# Patient Record
Sex: FEMALE | Race: WHITE | NOT HISPANIC OR LATINO
[De-identification: names, ages, dates, MRNs, and addresses within clinical notes are randomized per-mention and may not be internally consistent; named-entity substitution may affect disease eponyms.]

---

## 2019-01-18 PROBLEM — Z00.00 ENCOUNTER FOR PREVENTIVE HEALTH EXAMINATION: Status: ACTIVE | Noted: 2019-01-18

## 2019-01-22 ENCOUNTER — APPOINTMENT (OUTPATIENT)
Dept: SURGERY | Facility: CLINIC | Age: 34
End: 2019-01-22
Payer: COMMERCIAL

## 2019-01-22 VITALS
HEART RATE: 79 BPM | RESPIRATION RATE: 16 BRPM | OXYGEN SATURATION: 96 % | SYSTOLIC BLOOD PRESSURE: 123 MMHG | BODY MASS INDEX: 40.4 KG/M2 | DIASTOLIC BLOOD PRESSURE: 85 MMHG | WEIGHT: 214 LBS | HEIGHT: 61 IN

## 2019-01-22 DIAGNOSIS — Z80.42 FAMILY HISTORY OF MALIGNANT NEOPLASM OF PROSTATE: ICD-10-CM

## 2019-01-22 DIAGNOSIS — Z78.9 OTHER SPECIFIED HEALTH STATUS: ICD-10-CM

## 2019-01-22 PROCEDURE — 99242 OFF/OP CONSLTJ NEW/EST SF 20: CPT

## 2019-01-22 NOTE — PHYSICAL EXAM
[Normal Thyroid] : the thyroid was normal [Normal Breath Sounds] : Normal breath sounds [Normal Heart Sounds] : normal heart sounds [Normal Rate and Rhythm] : normal rate and rhythm [No HSM] : no hepatosplenomegaly [No Rash or Lesion] : No rash or lesion [Alert] : alert [Oriented to Person] : oriented to person [Oriented to Place] : oriented to place [Oriented to Time] : oriented to time [Calm] : calm [JVD] : no jugular venous distention  [Abdominal Masses] : No abdominal masses [Abdomen Tenderness] : ~T ~M No abdominal tenderness [Tender] : was nontender [Enlarged] : not enlarged [de-identified] : alert and oriented x 3 [de-identified] : normal [de-identified] : normal [de-identified] : anterior nodular abdominal wall mass [de-identified] : s

## 2019-01-22 NOTE — HISTORY OF PRESENT ILLNESS
[de-identified] : 32 yo female with no significant past medical history who presents to the office for evaluation of abdominal wall nodule. She states that she found it recently and has been bothering her since then.  She states that the area is tender to palpation but states that it has not gotten worse.

## 2019-01-22 NOTE — CONSULT LETTER
[Dear  ___] : Dear  [unfilled], [Consult Letter:] : I had the pleasure of evaluating your patient, [unfilled]. [Please see my note below.] : Please see my note below. [Consult Closing:] : Thank you very much for allowing me to participate in the care of this patient.  If you have any questions, please do not hesitate to contact me. [FreeTextEntry3] : Sincerely, \par \par \par David Buckley MD, FACS\par  of Surgery\par [facility]\par \par [employer] [facility]\par Associate \par General Surgery Residency\par [employer] [facility]\par

## 2019-01-22 NOTE — ASSESSMENT
[FreeTextEntry1] : 34 yo female with anterior abdominal wall mass\par -Excision under local anesthesia in the office\par -All risk, benefit, alternatives explained and the patient expressed full understanding.

## 2019-01-24 ENCOUNTER — LABORATORY RESULT (OUTPATIENT)
Age: 34
End: 2019-01-24

## 2019-01-24 ENCOUNTER — APPOINTMENT (OUTPATIENT)
Dept: SURGERY | Facility: CLINIC | Age: 34
End: 2019-01-24
Payer: COMMERCIAL

## 2019-01-24 VITALS
OXYGEN SATURATION: 99 % | SYSTOLIC BLOOD PRESSURE: 129 MMHG | WEIGHT: 199.56 LBS | DIASTOLIC BLOOD PRESSURE: 80 MMHG | BODY MASS INDEX: 37.68 KG/M2 | HEART RATE: 78 BPM | HEIGHT: 61 IN | TEMPERATURE: 98.4 F

## 2019-01-24 VITALS — DIASTOLIC BLOOD PRESSURE: 92 MMHG | OXYGEN SATURATION: 100 % | HEART RATE: 81 BPM | SYSTOLIC BLOOD PRESSURE: 135 MMHG

## 2019-01-24 PROCEDURE — 22902 EXC ABD LES SC < 3 CM: CPT

## 2019-01-24 NOTE — PROCEDURE
[FreeTextEntry1] : Procedure: Exicision of abdominal wall mass\par Path: Abdominal wall mass\par \par After informed consent was obtained, the abdominal wall was prepped and draped in sterile manner. Using 1% lidocaine, the abdominal wall was anesthesized. Using elliptical incision, the area was incised. At the conclusion of the excision, the area was probed to make sure there was no missing mass and I was sure that all nodular mass was removed in entirely.  Hemostasis was obtained and I begun the closure. Using 2-0 vicryl sutures, the skin was approximated. After that, 3-0 vicryl was used to approximate the wound further. 4-0 monocryl was used to approximate the skin further. Steristrips were placed over the wound. The patient tolerated the procedure well.

## 2019-02-04 ENCOUNTER — APPOINTMENT (OUTPATIENT)
Dept: SURGERY | Facility: CLINIC | Age: 34
End: 2019-02-04
Payer: COMMERCIAL

## 2019-02-04 VITALS
DIASTOLIC BLOOD PRESSURE: 87 MMHG | SYSTOLIC BLOOD PRESSURE: 129 MMHG | BODY MASS INDEX: 37.57 KG/M2 | HEART RATE: 76 BPM | WEIGHT: 199 LBS | HEIGHT: 61 IN | OXYGEN SATURATION: 97 %

## 2019-02-04 DIAGNOSIS — R22.2 LOCALIZED SWELLING, MASS AND LUMP, TRUNK: ICD-10-CM

## 2019-02-04 PROCEDURE — 99024 POSTOP FOLLOW-UP VISIT: CPT

## 2019-02-04 NOTE — HISTORY OF PRESENT ILLNESS
[de-identified] : Pt seen and examined. Pt is s/p excision of anterior abdominal wall nodule. Pathology revealed Lipoma. The wound edges are healing well but will need to be checked again before she can start wearing heavy holster for her job as . I don’t want the wound to break down.  Pt was advised to follow up next week with the office for follow up visit.

## 2019-02-11 ENCOUNTER — APPOINTMENT (OUTPATIENT)
Dept: SURGERY | Facility: CLINIC | Age: 34
End: 2019-02-11
Payer: COMMERCIAL

## 2019-02-11 VITALS
SYSTOLIC BLOOD PRESSURE: 131 MMHG | HEART RATE: 85 BPM | HEIGHT: 61 IN | OXYGEN SATURATION: 98 % | BODY MASS INDEX: 37.57 KG/M2 | WEIGHT: 199 LBS | DIASTOLIC BLOOD PRESSURE: 92 MMHG

## 2019-02-11 PROCEDURE — 99024 POSTOP FOLLOW-UP VISIT: CPT

## 2019-02-11 NOTE — HISTORY OF PRESENT ILLNESS
[de-identified] : pt seen and examined. pt is s/p excision of abdominal wall mass. wound edges healing well. one area had 3mm necrotic edges which was removed. pt was advised to keep the area clean and dry and  come back as needed.

## 2019-08-12 ENCOUNTER — APPOINTMENT (OUTPATIENT)
Dept: SURGERY | Facility: CLINIC | Age: 34
End: 2019-08-12

## 2020-05-12 ENCOUNTER — TRANSCRIPTION ENCOUNTER (OUTPATIENT)
Age: 35
End: 2020-05-12

## 2020-10-07 ENCOUNTER — TRANSCRIPTION ENCOUNTER (OUTPATIENT)
Age: 35
End: 2020-10-07

## 2020-12-10 ENCOUNTER — APPOINTMENT (OUTPATIENT)
Dept: OBGYN | Facility: CLINIC | Age: 35
End: 2020-12-10

## 2021-12-28 ENCOUNTER — TRANSCRIPTION ENCOUNTER (OUTPATIENT)
Age: 36
End: 2021-12-28

## 2022-06-23 ENCOUNTER — NON-APPOINTMENT (OUTPATIENT)
Age: 37
End: 2022-06-23

## 2022-12-15 ENCOUNTER — NON-APPOINTMENT (OUTPATIENT)
Age: 37
End: 2022-12-15

## 2023-03-01 ENCOUNTER — LABORATORY RESULT (OUTPATIENT)
Age: 38
End: 2023-03-01

## 2023-03-01 ENCOUNTER — NON-APPOINTMENT (OUTPATIENT)
Age: 38
End: 2023-03-01

## 2023-03-01 ENCOUNTER — TRANSCRIPTION ENCOUNTER (OUTPATIENT)
Age: 38
End: 2023-03-01

## 2023-03-01 ENCOUNTER — APPOINTMENT (OUTPATIENT)
Dept: CARDIOLOGY | Facility: CLINIC | Age: 38
End: 2023-03-01
Payer: COMMERCIAL

## 2023-03-01 VITALS
SYSTOLIC BLOOD PRESSURE: 156 MMHG | OXYGEN SATURATION: 99 % | DIASTOLIC BLOOD PRESSURE: 94 MMHG | HEIGHT: 61 IN | WEIGHT: 254 LBS | BODY MASS INDEX: 47.95 KG/M2 | HEART RATE: 71 BPM | RESPIRATION RATE: 16 BRPM | TEMPERATURE: 98.4 F

## 2023-03-01 DIAGNOSIS — R01.1 CARDIAC MURMUR, UNSPECIFIED: ICD-10-CM

## 2023-03-01 DIAGNOSIS — I10 ESSENTIAL (PRIMARY) HYPERTENSION: ICD-10-CM

## 2023-03-01 PROCEDURE — 99203 OFFICE O/P NEW LOW 30 MIN: CPT | Mod: 25

## 2023-03-01 PROCEDURE — 93000 ELECTROCARDIOGRAM COMPLETE: CPT

## 2023-03-01 NOTE — REASON FOR VISIT
[CV Risk Factors and Non-Cardiac Disease] : CV risk factors and non-cardiac disease [Hypertension] : hypertension [FreeTextEntry3] : Dr. Main Peters [FreeTextEntry1] : Patient is 37 year old female working in a  presenting for cardiac evaluation for high blood pressure. She denies past medical history but is status post hysterectomy, COVID March 2020 and Dec 2022. On 2/27/23, she states she was given a Kenalog injection in her left knee for a meniscal tear. After that, her BP jumped to 177/100. She denied any symptoms at the time other than the high blood pressure. Since then her BP has been around 140/90. Today she states it was 130/88. In office, her BP was 140/94. She is overall doing well now and denies chest pain, shortness of breath, dizziness, palpitations, or leg swelling. She is currently not on any medications. She has allergies to Penicillins but denies drinking or smoking history. Her mother and father had hypertension. Her paternal grandmother had a stroke at age 60. \par \par Her EKG on 3/1/23 shows sinus arrhythmia.

## 2023-03-01 NOTE — DISCUSSION/SUMMARY
[FreeTextEntry1] : This is a 37-year-old female with past medical history significant for status post COVID-19 infection March 2020 and December 2022, status post hysterectomy, who comes in for cardiac consultation.  She denies chest pain, shortness of breath, dizziness or syncope.  She has no history of rheumatic fever.  She does not drink excessive caffeine or alcohol.\par She has no known cardiac risk factors.\par On 2/27/23, she states she was given a Kenalog injection in her left knee for a meniscal tear. After that, her BP jumped to 177/100.  Her blood pressure was taken with a automatic cuff and was not checked with a manual cuff.  She denied any symptoms at the time other than the high blood pressure. Since then her BP has been around 140/90. Today she states it was 130/88.\par Her blood pressure is 136/93\par She is trying to improve her salt intake.\par Electrocardiogram done March 1, 2023 demonstrated normal sinus rhythm rate 70 bpm is otherwise unremarkable.\par The patient does have elevated blood pressure on today's examination.  I recommended she work with a registered dietitian to reduce his salt intake, caloric intake, glycemic intake and obtain weight loss to improve her blood pressure.\par I have also asked her to do resistance band training, especially while her knee is healing.\par She will follow-up with me in 1 month to reevaluate her blood pressure.\par If her blood pressure enedina elevated at that time, I would recommend medical therapy.\par Hopefully with lifestyle modification, including dietary changes and exercise, blood pressure will improve.\par I would recommend she schedule an echo Doppler examination to evaluate her left ventricular function, chamber size, rule out mitral valve prolapse and rule out hypertrophy.\par The patient understands that aerobic exercises must be increased to 40 minutes 4 times per week. A detailed discussion of lifestyle modification was done today. The patient has a good understanding of the diagnosis, and treatment plan. Lifestyle modification was also outlined.\par Thank you for allowing to participate in care of your patient.  Please not hesitate to call if you have any further questions.

## 2023-03-01 NOTE — PHYSICAL EXAM
[Well Developed] : well developed [Well Nourished] : well nourished [No Acute Distress] : no acute distress [Normal Conjunctiva] : normal conjunctiva [Normal Venous Pressure] : normal venous pressure [No Carotid Bruit] : no carotid bruit [Normal S1, S2] : normal S1, S2 [No Rub] : no rub [No Gallop] : no gallop [5th Left ICS - MCL] : palpated at the 5th LICS in the midclavicular line [Normal] : normal [No Precordial Heave] : no precordial heave was noted [Normal Rate] : normal [Rhythm Regular] : regular [Normal S1] : normal S1 [Normal S2] : normal S2 [I] : a grade 1 [No Pitting Edema] : no pitting edema present [2+] : left 2+ [No Abnormalities] : the abdominal aorta was not enlarged and no bruit was heard [Clear Lung Fields] : clear lung fields [Good Air Entry] : good air entry [No Respiratory Distress] : no respiratory distress  [Soft] : abdomen soft [Non Tender] : non-tender [No Masses/organomegaly] : no masses/organomegaly [Normal Bowel Sounds] : normal bowel sounds [Normal Gait] : normal gait [No Edema] : no edema [No Cyanosis] : no cyanosis [No Clubbing] : no clubbing [No Varicosities] : no varicosities [No Rash] : no rash [No Skin Lesions] : no skin lesions [Moves all extremities] : moves all extremities [No Focal Deficits] : no focal deficits [Normal Speech] : normal speech [Alert and Oriented] : alert and oriented [Normal memory] : normal memory [S3] : no S3 [S4] : no S4 [Right Carotid Bruit] : no bruit heard over the right carotid [Left Carotid Bruit] : no bruit heard over the left carotid [Right Femoral Bruit] : no bruit heard over the right femoral artery [Left Femoral Bruit] : no bruit heard over the left femoral artery

## 2023-04-03 ENCOUNTER — APPOINTMENT (OUTPATIENT)
Dept: CARDIOLOGY | Facility: CLINIC | Age: 38
End: 2023-04-03

## 2023-08-09 ENCOUNTER — NON-APPOINTMENT (OUTPATIENT)
Age: 38
End: 2023-08-09

## 2024-02-01 ENCOUNTER — APPOINTMENT (OUTPATIENT)
Dept: ORTHOPEDIC SURGERY | Facility: CLINIC | Age: 39
End: 2024-02-01
Payer: COMMERCIAL

## 2024-02-01 VITALS — BODY MASS INDEX: 47.95 KG/M2 | HEIGHT: 61 IN | WEIGHT: 254 LBS

## 2024-02-01 DIAGNOSIS — M51.26 OTHER INTERVERTEBRAL DISC DISPLACEMENT, LUMBAR REGION: ICD-10-CM

## 2024-02-01 DIAGNOSIS — M54.16 RADICULOPATHY, LUMBAR REGION: ICD-10-CM

## 2024-02-01 DIAGNOSIS — M47.816 SPONDYLOSIS W/OUT MYELOPATHY OR RADICULOPATHY, LUMBAR REGION: ICD-10-CM

## 2024-02-01 PROCEDURE — 72170 X-RAY EXAM OF PELVIS: CPT

## 2024-02-01 PROCEDURE — J3490M: CUSTOM

## 2024-02-01 PROCEDURE — 99204 OFFICE O/P NEW MOD 45 MIN: CPT | Mod: 25

## 2024-02-01 PROCEDURE — 72110 X-RAY EXAM L-2 SPINE 4/>VWS: CPT

## 2024-02-01 PROCEDURE — 20552 NJX 1/MLT TRIGGER POINT 1/2: CPT

## 2024-02-01 RX ORDER — METHYLPREDNISOLONE 4 MG/1
4 TABLET ORAL
Qty: 1 | Refills: 0 | Status: ACTIVE | COMMUNITY
Start: 2024-02-01 | End: 1900-01-01

## 2024-02-01 RX ORDER — CYCLOBENZAPRINE HYDROCHLORIDE 10 MG/1
10 TABLET, FILM COATED ORAL
Qty: 30 | Refills: 0 | Status: ACTIVE | COMMUNITY
Start: 2024-02-01 | End: 1900-01-01

## 2024-02-01 NOTE — HISTORY OF PRESENT ILLNESS
[Lower back] : lower back [9] : 9 [7] : 7 [Dull/Aching] : dull/aching [Constant] : constant [Social interactions] : social interactions [Meds] : meds [Sitting] : sitting [Full time] : Work status: full time [de-identified] : 2/1/24 - 39 yo F - pt is here today for lower back and spine pain. pain started on 1/25/24. no trauma. pt states no pain within groin area. pain is L>R. radiates across low back. no radic sx, no b/b changes.   h/o similar episode years ago, responded well to naproxen/ cyclobenzaprine in 2020  No PT/chiro/accupuncture/injection/brace   [employer]   [facility]: takes ozempic (not DM) hystectomy  No hx of cancer   xrays today lumbar - ap pelvis -  [] : no [FreeTextEntry1] : SPINE  [de-identified] :

## 2024-02-01 NOTE — DISCUSSION/SUMMARY
[de-identified] : reviewed the case and the imaging with her  MRI L spine  MDP/flexeril TPI toelrated well

## 2024-07-17 ENCOUNTER — APPOINTMENT (OUTPATIENT)
Dept: ORTHOPEDIC SURGERY | Facility: CLINIC | Age: 39
End: 2024-07-17
Payer: COMMERCIAL

## 2024-07-17 VITALS — HEIGHT: 61 IN | BODY MASS INDEX: 41.54 KG/M2 | WEIGHT: 220 LBS

## 2024-07-17 DIAGNOSIS — M51.26 OTHER INTERVERTEBRAL DISC DISPLACEMENT, LUMBAR REGION: ICD-10-CM

## 2024-07-17 DIAGNOSIS — M47.816 SPONDYLOSIS W/OUT MYELOPATHY OR RADICULOPATHY, LUMBAR REGION: ICD-10-CM

## 2024-07-17 DIAGNOSIS — M54.16 RADICULOPATHY, LUMBAR REGION: ICD-10-CM

## 2024-07-17 PROCEDURE — 20552 NJX 1/MLT TRIGGER POINT 1/2: CPT

## 2024-07-17 PROCEDURE — 99214 OFFICE O/P EST MOD 30 MIN: CPT | Mod: 25

## 2024-07-17 PROCEDURE — J3490M: CUSTOM

## 2024-07-17 RX ORDER — SEMAGLUTIDE 0.68 MG/ML
INJECTION, SOLUTION SUBCUTANEOUS
Refills: 0 | Status: ACTIVE | COMMUNITY

## 2024-12-25 PROBLEM — R01.2 SYSTOLIC CLICK: Status: ACTIVE | Noted: 2023-03-01

## 2025-04-21 ENCOUNTER — NON-APPOINTMENT (OUTPATIENT)
Age: 40
End: 2025-04-21